# Patient Record
Sex: FEMALE | Race: WHITE | Employment: OTHER | URBAN - METROPOLITAN AREA
[De-identification: names, ages, dates, MRNs, and addresses within clinical notes are randomized per-mention and may not be internally consistent; named-entity substitution may affect disease eponyms.]

---

## 2018-08-08 PROBLEM — I63.512 ACUTE ISCHEMIC LEFT MCA STROKE (HCC): Status: ACTIVE | Noted: 2018-08-08

## 2018-11-01 ENCOUNTER — HOSPITAL ENCOUNTER (OUTPATIENT)
Dept: SPEECH THERAPY | Age: 63
Setting detail: THERAPIES SERIES
Discharge: HOME | End: 2018-11-01
Payer: MEDICAID

## 2018-11-01 PROCEDURE — G9162 LANG EXPRESS CURRENT STATUS: HCPCS

## 2018-11-01 PROCEDURE — G9163 LANG EXPRESS GOAL STATUS: HCPCS

## 2018-11-01 PROCEDURE — 92523 SPEECH SOUND LANG COMPREHEN: CPT

## 2018-11-01 NOTE — PROGRESS NOTES
Informal: recognizes biographical information)  Phrases Impairment Severity:  (mild to moderate for concrete short 3 word phrases. Most effective for high frequency phrases.)  Sentence Impairment Severity:  (mild to moderate. Most effective with short/concrete/high frequency sentences)  Interfering Components:  (processing time dysphasia)  Effective Techniques: Prescription glasses/contact lenses    Expression  Primary Mode of Expression: Verbal (prior to stroke. Pt how uses verbal and gestures in attempts to convey ideas)  Verbal Expression  Verbal Expression: Exceptions to functional limits  Repetition:  ( 10/10 but slow and intermittent visual o-m placement cues at word level and at high frequency phrase level)  Automatic Speech:  (functional for greetings; y/n and occasional spontaneous utterance)  Confrontation:  (<25% IND (BDAE CFN); CFN with written word <25%. Improves with phonemic cues and oral motor placement cues)  Conversation: Severe  Interfering Components:  (dyspraxia and dysphasia)    Written Expression  Dominant Hand: Right  Written Expression: Exceptions to [facility]  Self formulation Impairment Severity:  (Pt expressing name; dtr's name; part of . Pt is able to copy words; phrases)    Motor Speech  Motor Speech: Exceptions to [facility]  Apraxia:  (Marked dysphasia and dyspraxia features impacting expression. Intelligibility with imitative / assisted communication >75% intelligible. Voice audible/strong but slow deliberate with reduced voice prosody)  Labial ROM: Reduced right  Labial Symmetry: Abnormal symmetry right  Labial Strength:  (right strength reduced)  Lingual Symmetry:  (slight asymmetry on protrusion and elevation. Otherwise achieving moderate rom)    Pragmatics/Social Functioning  Pragmatics: Exceptions to [facility] (aphasia impacts. Pt insightful and attempts to gesture .  Visible pt frustration)    Cognition:   Orientation  Overall Orientation Status: Impaired  Orientation Level:  (difficult to

## 2018-11-08 ENCOUNTER — HOSPITAL ENCOUNTER (OUTPATIENT)
Dept: SPEECH THERAPY | Age: 63
Setting detail: THERAPIES SERIES
Discharge: HOME | End: 2018-11-08
Payer: MEDICAID

## 2018-11-08 PROCEDURE — 92507 TX SP LANG VOICE COMM INDIV: CPT

## 2018-11-08 NOTE — PROGRESS NOTES
will demonstrate improved self formulated written expression of biographical information (age; address; phone;  etc) with set up and <moderate models. 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 Dtr is having trouble in schedule therapy 2x a week due to her work schedule. We were able to set up for tomorrow and 2 sessions next week    Plan:   · Continue therapy 2 times a week unless notified by family due to transport/schedule problems. · Next scheduled time is 11/10/2018 at 11:30    Timed Code Treatment: 0  Total Treatment Time: 45    Signature: Ayesha AdhikariMS,CCC,SLP 1254  Speech and Language Pathologist

## 2018-11-09 ENCOUNTER — HOSPITAL ENCOUNTER (OUTPATIENT)
Dept: SPEECH THERAPY | Age: 63
Setting detail: THERAPIES SERIES
Discharge: HOME | End: 2018-11-09
Payer: MEDICAID

## 2018-11-09 PROCEDURE — 92507 TX SP LANG VOICE COMM INDIV: CPT

## 2018-11-09 NOTE — PROGRESS NOTES
Speech Language Pathology     Speech-Language Pathology  Daily Treatment Note    Date:  2018    Patient Name:  James Sun    :  1955  MRN: [id number]  Restrictions/Precautions:  Aphasia and Communication deficits impacting functional communication  Diagnosis:  S/P CVA with Aphasia  Treatment Diagnosis:  S/P CVA with Receptive and Expressive Language Aphasia; Dyspraxia  Insurance/Certification information:  [employer]  Referring Physician:  Dr. Rebel Edge of care signed (Y/N):  Seen POC sent  Visit# / total visits: One Evaluation Session and 2 Treatment Session  Pain level: 5/10 Left arm/shoulder    G-Code (if applicable):      Date / Visit # G-Code Applied:  /  Targeted for Spoken expression       Progress Note: []  Yes  [x]  No  Next due by: Visit #10     Subjective:    Pt was accompanied to therapy by her Dtr  Pt gesturing to assist with communicating responses to simple questions regarding pain; am appointment. Moderate to  Max clarification questions/paraphrasing to confirm  Dtr is aware of pt pt complaints. They have discussed with MD    Objective: Pt/family ed with Dtr  Receptive Language  · Auditory Word/picture discrimination (field of 12 color photos of DL and home/community objects/foods/beverages): 75%-92%.  Pt had an easier time today with color photos as compared to 2018 black and white photos  · Auditory Comprehension of 4-6 unit concrete statements:68% without visual cues  · Auditory processing for following one step (2-4 unit) psychomotor commands: 100% with visual demonstrations; 33% without visual demonstrations  · Visual Language processing (written word / phrase)in semantic category-ie clothing; grooming items): no data taken but targeted in directed during auditory and verbal expressive language tasks    Expressive Language  · CFN with models: 74%  · Word level via sentence completion (open ended): <50% (perseveration of 'paper')  · Word level via sentence

## 2018-11-16 ENCOUNTER — HOSPITAL ENCOUNTER (OUTPATIENT)
Dept: SPEECH THERAPY | Age: 63
Setting detail: THERAPIES SERIES
Discharge: HOME | End: 2018-11-16
Payer: MEDICAID

## 2018-11-16 PROCEDURE — 92507 TX SP LANG VOICE COMM INDIV: CPT

## 2018-11-16 NOTE — PROGRESS NOTES
· Expression of basic needs/wants/emotions using pictured stimuli: reinforced use of hand gestures to assist verbalization with 25% success. Max clarification questions; visual cues to confirm pt 's message; attempts to write message was <25%  Written Expressive Language  · Full name IND  · Dtr's name IND  · Address IND for street name/number  · Phone number: IND  · Words on confrontation (pictured DL items utilized): 90%  · 1-2 words for automatic phrase completion: Max models    Goal Status  Short-term Goals   Pt goal is to talk: therapy is working toward pt goal  Goal 1: PT will demonstrate improved language processing for 4-6 unit concrete questions/directions with < moderate visual cues: ongoing goal  Goal 2: Pt will demonstrate improved word retrieval for CFN and Responsive naming with <moderate assist : ongoing goal   Goal 3: Pt will demonstrate improved expression of basic biographical information with set up; use of compensatory strategies and <mild assist: ongoing goal   Goal 4: PT will demonstrate improved self formulated written expression of biographical information (age; address; phone;  etc) with set up and <moderate models. 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 SLP discussed various APPS for pt/family for home. Dtr reports she is considering getting her mom a tablet. Plan:   · Continue therapy 2 times a week unless notified by family due to transport/schedule problems.   · Next scheduled time is 2018 at 10:45  · Gave handouts regarding Communication/Aphasia  APPS for Dtr  · DT to look for category picture book from Rehab

## 2018-11-27 ENCOUNTER — HOSPITAL ENCOUNTER (OUTPATIENT)
Dept: SPEECH THERAPY | Age: 63
Setting detail: THERAPIES SERIES
Discharge: HOME | End: 2018-11-27
Payer: MEDICAID

## 2018-11-27 PROCEDURE — 92507 TX SP LANG VOICE COMM INDIV: CPT

## 2018-12-06 ENCOUNTER — HOSPITAL ENCOUNTER (OUTPATIENT)
Dept: SPEECH THERAPY | Age: 63
Setting detail: THERAPIES SERIES
Discharge: HOME | End: 2018-12-06
Payer: MEDICAID

## 2018-12-06 PROCEDURE — 92507 TX SP LANG VOICE COMM INDIV: CPT

## 2019-01-08 ENCOUNTER — TELEPHONE (OUTPATIENT)
Dept: INTERNAL MEDICINE CLINIC | Age: 64
End: 2019-01-08

## 2019-02-07 ENCOUNTER — APPOINTMENT (OUTPATIENT)
Dept: GENERAL RADIOLOGY | Age: 64
End: 2019-02-07
Payer: MEDICAID

## 2019-02-07 ENCOUNTER — HOSPITAL ENCOUNTER (EMERGENCY)
Age: 64
Discharge: HOME | End: 2019-02-07
Attending: EMERGENCY MEDICINE
Payer: MEDICAID

## 2019-02-07 VITALS
RESPIRATION RATE: 15 BRPM | OXYGEN SATURATION: 98 % | SYSTOLIC BLOOD PRESSURE: 164 MMHG | DIASTOLIC BLOOD PRESSURE: 92 MMHG | WEIGHT: 139.99 LBS | HEIGHT: 70 IN | BODY MASS INDEX: 20.04 KG/M2 | TEMPERATURE: 98.1 F | HEART RATE: 72 BPM

## 2019-02-07 DIAGNOSIS — S52.501A CLOSED FRACTURE OF DISTAL END OF RIGHT RADIUS, UNSPECIFIED FRACTURE MORPHOLOGY, INITIAL ENCOUNTER: Primary | ICD-10-CM

## 2019-02-07 PROCEDURE — 73110 X-RAY EXAM OF WRIST: CPT

## 2019-02-07 PROCEDURE — 6370000000 HC RX 637 (ALT 250 FOR IP): Performed by: EMERGENCY MEDICINE

## 2019-02-07 PROCEDURE — 99283 EMERGENCY DEPT VISIT LOW MDM: CPT

## 2019-02-07 PROCEDURE — 4500000023 HC ED LEVEL 3 PROCEDURE

## 2019-02-07 RX ORDER — OXYCODONE HYDROCHLORIDE AND ACETAMINOPHEN 5; 325 MG/1; MG/1
1 TABLET ORAL ONCE
Status: COMPLETED | OUTPATIENT
Start: 2019-02-07 | End: 2019-02-07

## 2019-02-07 RX ADMIN — OXYCODONE AND ACETAMINOPHEN 1 TABLET: 5; 325 TABLET ORAL at 15:59

## 2019-02-07 ASSESSMENT — PAIN DESCRIPTION - ORIENTATION: ORIENTATION: RIGHT

## 2019-02-07 ASSESSMENT — PAIN SCALES - GENERAL
PAINLEVEL_OUTOF10: 6
PAINLEVEL_OUTOF10: 5
PAINLEVEL_OUTOF10: 9
PAINLEVEL_OUTOF10: 9

## 2019-02-07 ASSESSMENT — PAIN DESCRIPTION - PAIN TYPE: TYPE: ACUTE PAIN

## 2019-02-07 ASSESSMENT — PAIN DESCRIPTION - LOCATION: LOCATION: ARM

## 2019-02-08 ENCOUNTER — OFFICE VISIT (OUTPATIENT)
Dept: ORTHOPEDIC SURGERY | Age: 64
End: 2019-02-08
Payer: MEDICAID

## 2019-02-08 VITALS
WEIGHT: 139 LBS | RESPIRATION RATE: 16 BRPM | DIASTOLIC BLOOD PRESSURE: 106 MMHG | BODY MASS INDEX: 19.9 KG/M2 | HEIGHT: 70 IN | SYSTOLIC BLOOD PRESSURE: 188 MMHG | HEART RATE: 69 BPM

## 2019-02-08 DIAGNOSIS — S52.571A OTHER CLOSED INTRA-ARTICULAR FRACTURE OF DISTAL END OF RIGHT RADIUS, INITIAL ENCOUNTER: Primary | ICD-10-CM

## 2019-02-08 PROBLEM — S52.501A CLOSED FRACTURE OF RIGHT DISTAL RADIUS: Status: ACTIVE | Noted: 2019-02-08

## 2019-02-08 PROCEDURE — 3017F COLORECTAL CA SCREEN DOC REV: CPT | Performed by: ORTHOPAEDIC SURGERY

## 2019-02-08 PROCEDURE — G8420 CALC BMI NORM PARAMETERS: HCPCS | Performed by: ORTHOPAEDIC SURGERY

## 2019-02-08 PROCEDURE — 99203 OFFICE O/P NEW LOW 30 MIN: CPT | Performed by: ORTHOPAEDIC SURGERY

## 2019-02-08 PROCEDURE — 25600 CLTX DST RDL FX/EPHYS SEP WO: CPT | Performed by: ORTHOPAEDIC SURGERY

## 2019-02-08 PROCEDURE — G8427 DOCREV CUR MEDS BY ELIG CLIN: HCPCS | Performed by: ORTHOPAEDIC SURGERY

## 2019-02-08 PROCEDURE — 4004F PT TOBACCO SCREEN RCVD TLK: CPT | Performed by: ORTHOPAEDIC SURGERY

## 2019-02-08 PROCEDURE — G8599 NO ASA/ANTIPLAT THER USE RNG: HCPCS | Performed by: ORTHOPAEDIC SURGERY

## 2019-02-08 PROCEDURE — G8484 FLU IMMUNIZE NO ADMIN: HCPCS | Performed by: ORTHOPAEDIC SURGERY

## 2019-02-08 PROCEDURE — L3908 WHO COCK-UP NONMOLDE PRE OTS: HCPCS | Performed by: ORTHOPAEDIC SURGERY

## 2019-02-08 RX ORDER — HYDRALAZINE HYDROCHLORIDE 25 MG/1
25 TABLET, FILM COATED ORAL
COMMUNITY
Start: 2018-08-08

## 2019-02-08 RX ORDER — LISINOPRIL 40 MG/1
TABLET ORAL
Refills: 5 | COMMUNITY
Start: 2019-01-29

## 2019-03-27 ENCOUNTER — OFFICE VISIT (OUTPATIENT)
Dept: ORTHOPEDIC SURGERY | Age: 64
End: 2019-03-27

## 2019-03-27 VITALS — WEIGHT: 139 LBS | BODY MASS INDEX: 19.9 KG/M2 | RESPIRATION RATE: 16 BRPM | HEIGHT: 70 IN

## 2019-03-27 DIAGNOSIS — S52.571A OTHER CLOSED INTRA-ARTICULAR FRACTURE OF DISTAL END OF RIGHT RADIUS, INITIAL ENCOUNTER: Primary | ICD-10-CM

## 2019-03-27 PROCEDURE — APPNB30 APP NON BILLABLE TIME 0-30 MINS: Performed by: NURSE PRACTITIONER

## 2019-03-27 PROCEDURE — 99024 POSTOP FOLLOW-UP VISIT: CPT | Performed by: NURSE PRACTITIONER

## 2019-09-08 ENCOUNTER — APPOINTMENT (OUTPATIENT)
Dept: GENERAL RADIOLOGY | Age: 64
End: 2019-09-08
Payer: MEDICARE

## 2019-09-08 ENCOUNTER — HOSPITAL ENCOUNTER (EMERGENCY)
Age: 64
Discharge: HOME | End: 2019-09-09
Attending: EMERGENCY MEDICINE
Payer: MEDICARE

## 2019-09-08 VITALS
DIASTOLIC BLOOD PRESSURE: 91 MMHG | OXYGEN SATURATION: 96 % | SYSTOLIC BLOOD PRESSURE: 171 MMHG | RESPIRATION RATE: 16 BRPM | HEIGHT: 69 IN | WEIGHT: 141.98 LBS | HEART RATE: 67 BPM | TEMPERATURE: 97.8 F | BODY MASS INDEX: 21.03 KG/M2

## 2019-09-08 DIAGNOSIS — N30.00 ACUTE CYSTITIS WITHOUT HEMATURIA: ICD-10-CM

## 2019-09-08 DIAGNOSIS — M25.512 ACUTE PAIN OF LEFT SHOULDER: Primary | ICD-10-CM

## 2019-09-08 DIAGNOSIS — M25.552 LEFT HIP PAIN: ICD-10-CM

## 2019-09-08 LAB
BACTERIA: ABNORMAL /HPF
BILIRUBIN URINE: NEGATIVE
BLOOD, URINE: NEGATIVE
CLARITY: CLEAR
COLOR: YELLOW
EPITHELIAL CELLS, UA: ABNORMAL /HPF
GLUCOSE URINE: NEGATIVE MG/DL
KETONES, URINE: NEGATIVE MG/DL
LEUKOCYTE ESTERASE, URINE: ABNORMAL
MICROSCOPIC EXAMINATION: YES
NITRITE, URINE: POSITIVE
PH UA: 5 (ref 5–8)
PROTEIN UA: NEGATIVE MG/DL
RBC UA: ABNORMAL /HPF (ref 0–2)
SPECIFIC GRAVITY UA: <=1.005 (ref 1–1.03)
URINE REFLEX TO CULTURE: YES
URINE TYPE: ABNORMAL
UROBILINOGEN, URINE: 0.2 E.U./DL
WBC UA: ABNORMAL /HPF (ref 0–5)

## 2019-09-08 PROCEDURE — 87086 URINE CULTURE/COLONY COUNT: CPT

## 2019-09-08 PROCEDURE — 87077 CULTURE AEROBIC IDENTIFY: CPT

## 2019-09-08 PROCEDURE — 99284 EMERGENCY DEPT VISIT MOD MDM: CPT

## 2019-09-08 PROCEDURE — 73502 X-RAY EXAM HIP UNI 2-3 VIEWS: CPT

## 2019-09-08 PROCEDURE — 73030 X-RAY EXAM OF SHOULDER: CPT

## 2019-09-08 PROCEDURE — 87186 SC STD MICRODIL/AGAR DIL: CPT

## 2019-09-08 PROCEDURE — 81001 URINALYSIS AUTO W/SCOPE: CPT

## 2019-09-08 RX ORDER — NITROFURANTOIN 25; 75 MG/1; MG/1
100 CAPSULE ORAL ONCE
Status: COMPLETED | OUTPATIENT
Start: 2019-09-09 | End: 2019-09-09

## 2019-09-08 RX ORDER — NITROFURANTOIN 25; 75 MG/1; MG/1
100 CAPSULE ORAL 2 TIMES DAILY
Qty: 14 CAPSULE | Refills: 0 | Status: SHIPPED | OUTPATIENT
Start: 2019-09-08 | End: 2019-09-15

## 2019-09-08 ASSESSMENT — PAIN DESCRIPTION - LOCATION: LOCATION: ARM

## 2019-09-08 ASSESSMENT — PAIN DESCRIPTION - PAIN TYPE: TYPE: ACUTE PAIN

## 2019-09-08 ASSESSMENT — PAIN DESCRIPTION - ONSET: ONSET: ON-GOING

## 2019-09-08 ASSESSMENT — PAIN DESCRIPTION - FREQUENCY: FREQUENCY: CONTINUOUS

## 2019-09-08 ASSESSMENT — PAIN SCALES - GENERAL
PAINLEVEL_OUTOF10: 8
PAINLEVEL_OUTOF10: 8

## 2019-09-08 ASSESSMENT — PAIN DESCRIPTION - DESCRIPTORS: DESCRIPTORS: ACHING

## 2019-09-08 ASSESSMENT — PAIN DESCRIPTION - PROGRESSION: CLINICAL_PROGRESSION: NOT CHANGED

## 2019-09-08 ASSESSMENT — PAIN DESCRIPTION - ORIENTATION: ORIENTATION: LEFT

## 2019-09-09 PROCEDURE — 6370000000 HC RX 637 (ALT 250 FOR IP): Performed by: EMERGENCY MEDICINE

## 2019-09-09 RX ADMIN — NITROFURANTOIN MONOHYDRATE/MACROCRYSTALLINE 100 MG: 25; 75 CAPSULE ORAL at 00:03

## 2019-09-09 ASSESSMENT — PAIN SCALES - GENERAL: PAINLEVEL_OUTOF10: 3

## 2019-09-09 ASSESSMENT — PAIN DESCRIPTION - LOCATION: LOCATION: ARM;LEG

## 2019-09-09 ASSESSMENT — PAIN DESCRIPTION - PAIN TYPE: TYPE: ACUTE PAIN

## 2019-09-09 NOTE — ED PROVIDER NOTES
no wheezing no retraction or accessory muscle use,      EXTREMITIES: No acute deformities, no peripheral edema, patient had good range of motion of her left arm and shoulder and full radial ulnar median nerve function in the hand. She was ambulatory and had no difficulty walking and good range of motion of her left hip. SKIN: Warm and dry. Normal color, no rash,  capillary refill less than 2 seconds  MENTAL STATUS: Alert, somewhat interactive, at baseline per family    Nursing note and vital signs reviewed     I have reviewed and interpreted all of the currently available lab results from this visit (if applicable):  Results for orders placed or performed during the hospital encounter of 09/08/19   Urinalysis Reflex to Culture   Result Value Ref Range    Color, UA Yellow Straw/Yellow    Clarity, UA Clear Clear    Glucose, Ur Negative Negative mg/dL    Bilirubin Urine Negative Negative    Ketones, Urine Negative Negative mg/dL    Specific Gravity, UA <=1.005 1.005 - 1.030    Blood, Urine Negative Negative    pH, UA 5.0 5.0 - 8.0    Protein, UA Negative Negative mg/dL    Urobilinogen, Urine 0.2 <2.0 E.U./dL    Nitrite, Urine POSITIVE (A) Negative    Leukocyte Esterase, Urine SMALL (A) Negative    Microscopic Examination YES     Urine Reflex to Culture Yes     Urine Type Voided    Microscopic Urinalysis   Result Value Ref Range    WBC, UA 10-20 (A) 0 - 5 /HPF    RBC, UA 0-2 0 - 2 /HPF    Epi Cells 3-5 /HPF    Bacteria, UA 3+ (A) /HPF        Radiographs (if obtained):  ? Radiologist's Report Reviewed:  XR SHOULDER LEFT (MIN 2 VIEWS)   Final Result   Left shoulder: Old fracture deformity of the left clavicle. Possible   superimposed acute fracture. Evaluation for point tenderness is recommended. Left hip: No acute osseous abnormality. XR HIP LEFT (2-3 VIEWS)   Final Result   Left shoulder: Old fracture deformity of the left clavicle. Possible   superimposed acute fracture.   Evaluation for point mis-transcribed.)    MD Hema Greenfield., MD  09/08/19 [id number]

## 2019-09-10 LAB
ORGANISM: ABNORMAL
URINE CULTURE, ROUTINE: ABNORMAL